# Patient Record
Sex: FEMALE | Race: BLACK OR AFRICAN AMERICAN
[De-identification: names, ages, dates, MRNs, and addresses within clinical notes are randomized per-mention and may not be internally consistent; named-entity substitution may affect disease eponyms.]

---

## 2020-02-24 ENCOUNTER — HOSPITAL ENCOUNTER (OUTPATIENT)
Dept: HOSPITAL 35 - RAD | Age: 80
End: 2020-02-24
Attending: FAMILY MEDICINE
Payer: COMMERCIAL

## 2020-02-24 DIAGNOSIS — M48.02: ICD-10-CM

## 2020-02-24 DIAGNOSIS — M41.86: ICD-10-CM

## 2020-02-24 DIAGNOSIS — M81.0: ICD-10-CM

## 2020-02-24 DIAGNOSIS — M25.78: ICD-10-CM

## 2020-02-24 DIAGNOSIS — M48.04: ICD-10-CM

## 2020-02-24 DIAGNOSIS — M41.80: ICD-10-CM

## 2020-02-24 DIAGNOSIS — M43.02: Primary | ICD-10-CM

## 2020-03-12 ENCOUNTER — HOSPITAL ENCOUNTER (OUTPATIENT)
Dept: HOSPITAL 35 - NUC | Age: 80
End: 2020-03-12
Attending: FAMILY MEDICINE
Payer: COMMERCIAL

## 2020-03-12 DIAGNOSIS — Z13.820: Primary | ICD-10-CM

## 2020-03-12 DIAGNOSIS — M81.0: ICD-10-CM

## 2020-03-12 DIAGNOSIS — Z78.0: ICD-10-CM

## 2022-01-07 ENCOUNTER — HOSPITAL ENCOUNTER (INPATIENT)
Dept: HOSPITAL 35 - ER | Age: 82
LOS: 19 days | Discharge: HOSPICE | DRG: 871 | End: 2022-01-26
Attending: HOSPITALIST | Admitting: HOSPITALIST
Payer: COMMERCIAL

## 2022-01-07 VITALS — HEIGHT: 60 IN | WEIGHT: 104.1 LBS | BODY MASS INDEX: 20.44 KG/M2

## 2022-01-07 VITALS — SYSTOLIC BLOOD PRESSURE: 124 MMHG | DIASTOLIC BLOOD PRESSURE: 57 MMHG

## 2022-01-07 DIAGNOSIS — R62.7: ICD-10-CM

## 2022-01-07 DIAGNOSIS — R41.0: ICD-10-CM
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
## 2022-01-07 DIAGNOSIS — E87.2: ICD-10-CM

## 2022-01-07 DIAGNOSIS — L89.210: ICD-10-CM

## 2022-01-07 DIAGNOSIS — D64.9: ICD-10-CM

## 2022-01-07 DIAGNOSIS — R63.4: ICD-10-CM

## 2022-01-07 DIAGNOSIS — A41.89: Primary | ICD-10-CM
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
## 2022-01-07 DIAGNOSIS — S90.31XA: ICD-10-CM

## 2022-01-07 DIAGNOSIS — F32.9: ICD-10-CM

## 2022-01-07 DIAGNOSIS — D69.6: ICD-10-CM

## 2022-01-07 DIAGNOSIS — Z83.3: ICD-10-CM
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
## 2022-01-07 DIAGNOSIS — L89.113: ICD-10-CM

## 2022-01-07 DIAGNOSIS — E43: ICD-10-CM

## 2022-01-07 DIAGNOSIS — U07.1: ICD-10-CM

## 2022-01-07 DIAGNOSIS — R65.20: ICD-10-CM

## 2022-01-07 DIAGNOSIS — G92.8: ICD-10-CM

## 2022-01-07 DIAGNOSIS — Z66: ICD-10-CM

## 2022-01-07 DIAGNOSIS — S30.0XXA: ICD-10-CM

## 2022-01-07 DIAGNOSIS — Z82.49: ICD-10-CM

## 2022-01-07 DIAGNOSIS — T79.6XXA: ICD-10-CM

## 2022-01-07 DIAGNOSIS — Z99.3: ICD-10-CM

## 2022-01-07 DIAGNOSIS — L89.623: ICD-10-CM
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
## 2022-01-07 DIAGNOSIS — F41.9: ICD-10-CM

## 2022-01-07 DIAGNOSIS — L89.893: ICD-10-CM

## 2022-01-07 DIAGNOSIS — G20: ICD-10-CM

## 2022-01-07 DIAGNOSIS — J12.82: ICD-10-CM

## 2022-01-07 DIAGNOSIS — R53.81: ICD-10-CM

## 2022-01-07 LAB
ALBUMIN SERPL-MCNC: 3.9 G/DL (ref 3.4–5)
ALT SERPL-CCNC: 74 U/L (ref 14–59)
ANION GAP SERPL CALC-SCNC: 12 MMOL/L (ref 7–16)
AST SERPL-CCNC: 201 U/L (ref 15–37)
BILIRUB SERPL-MCNC: 0.9 MG/DL (ref 0.2–1)
BILIRUB UR-MCNC: NEGATIVE MG/DL
BUN SERPL-MCNC: 41 MG/DL (ref 7–18)
CALCIUM SERPL-MCNC: 9.5 MG/DL (ref 8.5–10.1)
CHLORIDE SERPL-SCNC: 100 MMOL/L (ref 98–107)
CO2 SERPL-SCNC: 25 MMOL/L (ref 21–32)
COLOR UR: YELLOW
CREAT SERPL-MCNC: 0.8 MG/DL (ref 0.6–1)
ERYTHROCYTE [DISTWIDTH] IN BLOOD BY AUTOMATED COUNT: 13.3 % (ref 10.5–14.5)
GLUCOSE SERPL-MCNC: 135 MG/DL (ref 74–106)
GRANULOCYTES NFR BLD MANUAL: 91 % (ref 36–66)
HCT VFR BLD CALC: 47.1 % (ref 37–47)
HGB BLD-MCNC: 15.7 GM/DL (ref 12–15)
KETONES UR STRIP-MCNC: (no result) MG/DL
LYMPHOCYTES NFR BLD AUTO: 5 % (ref 24–44)
MCH RBC QN AUTO: 31 PG (ref 26–34)
MCHC RBC AUTO-ENTMCNC: 33.3 G/DL (ref 28–37)
MCV RBC: 93.1 FL (ref 80–100)
MONOCYTES NFR BLD: 3 % (ref 1–8)
NEUTROPHILS # BLD: 13.3 THOU/UL (ref 1.4–8.2)
NEUTS BAND NFR BLD: 1 % (ref 0–8)
PLATELET # BLD EST: NORMAL 10*3/UL
PLATELET # BLD: 196 THOU/UL (ref 150–400)
POTASSIUM SERPL-SCNC: 4.3 MMOL/L (ref 3.5–5.1)
PROT SERPL-MCNC: 7.7 G/DL (ref 6.4–8.2)
RBC # BLD AUTO: 5.06 MIL/UL (ref 4.2–5)
RBC # UR STRIP: (no result) /UL
RBC #/AREA URNS HPF: (no result) /HPF
RBC MORPH BLD: NORMAL
SODIUM SERPL-SCNC: 137 MMOL/L (ref 136–145)
SP GR UR STRIP: >= 1.03 (ref 1–1.03)
SQUAMOUS: (no result) /LPF (ref 0–3)
URINE CLARITY: CLEAR
URINE GLUCOSE-RANDOM*: NEGATIVE
URINE LEUKOCYTES-REFLEX: NEGATIVE
URINE NITRITE-REFLEX: NEGATIVE
URINE PROTEIN (DIPSTICK): (no result)
URINE WBC-REFLEX: (no result) /HPF (ref 0–5)
UROBILINOGEN UR STRIP-ACNC: 0.2 E.U./DL (ref 0.2–1)
WBC # BLD AUTO: 14.5 THOU/UL (ref 4–11)

## 2022-01-07 PROCEDURE — 10081 I&D PILONIDAL CYST COMP: CPT

## 2022-01-07 PROCEDURE — 10080 I&D PILONIDAL CYST SIMPLE: CPT

## 2022-01-07 PROCEDURE — 10879: CPT

## 2022-01-07 PROCEDURE — 10194: CPT

## 2022-01-07 NOTE — EMS
[address]   [address]                     EMS Patient Care Report       
_______________________________________________________________________________
 
Name:       CIERA PRESCOTT             Room #:         351-P       ADM IN  
M.R.#:      [id number]                       Account #:      [id number]  
Admission:  22    Attend Phys:    Franci Blevins MD   
Discharge:              Date of Birth:  [birth year]  
                                                          Report #: [id number]
                                                                    [id number]
_______________________________________________________________________________
THIS REPORT FOR:   //name//                          
 
Report Transmitted: 2022 09:29
EMS Care Summary
[place], [place]/[facility]
Incident [id number] @ 2022 16:11
 
Incident Location
[address] [place] [address]
 
Patient
CIERA PRESCOTT
Female, 81 Years
 1940
 
Patient Address
[address] [place] [address]
 
Patient History
Parkinson's Disease,Anxiety Disorder (Panic Attacks),Anxiety,
 
Patient Allergies
No known allergies,
 
Patient Medications
Gabapentin,
 
Chief Complaint
AMS AFTER FALL
 
Disposition
Transported No Lights/[facility]
 
Dispatch Reason
Falls
 
Transported To
[facility]
 
Narrative
M41 DISPATCHED TO A FALL.
 
M41 AOS WITH P41 AND FOUND A FEMALE PT ON THE FLOOR OF HER BEDROOM.  P41 HAD 
ALREADY PLACED A C COLLAR ON THE PT.  THE PTS FAMILY STATES THT THEY LAST HEARD 
 
 
 
[address]   [address]                     EMS Patient Care Report       
_______________________________________________________________________________
 
Name:       CIERA PRESCOTT             Room #:         351-P       ADM IN  
M.RIVERA.#:      [id number]                       Account #:      [id number]  
Admission:  22    Attend Phys:    Franci Blevins MD   
Discharge:              Date of Birth:  [birth year]  
                                                          Report #: [id number]
                                                                    [id number]
_______________________________________________________________________________
FROM HER 2 DAYS AGO AND NO ONE HAS SEEN HER SINCE.  THE PT APPEARS TO HAVE 
FALLEN AT LEAST 24 HOURS AGO.  AMMONIA SMELL WAS NOTED FROM THE FRONT DOOR.  
THE PT DOES HAVE A SMALL LACERATION ABOVE HER LEFT EYE.  PT IS NOT ON ANY BLOOD 
THINNERS.  THE PT DENIES ANY PAIN.  PT DOES ANSWER QUESTIONS BUT IS SLIGHTLY 
CONFUSED.  THE PTS HX, MEDS, AND ALLERGIES GATHERED FROM FAMILY. 
 
PT MOVED TO A STAIRCHAIR AND TO THE COT. VITALS OBTAINED. BGA OBTAINED. IV 
ACCESS OBTAINED. 4 LEAD OBTAINED. PT PLACED ON 4LPM O2 NC. 
 
M41 EN ROUTE ST [facility]. EN ROUTE PT REMAINED STABLE.
 
REPORT GIVEN TO ALEKSANDER CARD. SIGNATURES OBTAINED. I SIGNED FOR PT DUE TO AMS. 
TRANSFER OF CARE TOOK PLACE. 
 
M41 IN SERVICE.
 
 
 
XIOMARA PAYNE
PARAMEDIC
 
Initial Vitals
@16:30P: 57,SpO2: 80,
@16:41P: 114,BP: 128/73,
@16:35P: 115,R: 18,BP: 115/74,Pain: 0/10,GCS: 14,Glucose: 122,CO: 0,SpO2: 
94,Revised Trauma: 12,MI Suspected: false 
@16:30P: 75,R: 18,BP: 130/70,Pain: 0/10,GCS: 14,SpO2: 80,Revised Trauma: 12,
 
Assessments
@16:18MENTAL:Confused,Place Oriented,Time Oriented,SKIN:HEENT:Head/Face: 
Other,Neck/Airway: No Abnormalities,LUNG SOUNDS:General: No 
Abnormalities,ABDOMEN:General: No Abnormalities,PELVIS//GI:No 
Abnormalities,EXTREMITIES:Left Arm: Other,Left Leg: Other,Right Leg: 
Other,Right Arm: Other,Capillary Refill: Right Upper: < 2 Sec,PULSE:Radial: 2+ 
Normal,NEURO:No Abnormalities, 
 
Impression
Altered Mental Status
 
Procedures
@16:33 IV Therapy - Saline Lock 7cc (20 ga) Site: Forearm-Left Response: 
UnchangedSucceeded 
@16:18 ALS Assessment Response: UnchangedSucceeded
@16:34 3-Lead ECG Response: UnchangedSucceeded
@16:33 Oxygen FlowRate: 4 Device: Nasal Cannula (NC)  Response: 
ImprovedSucceeded 
 
 
 
[facility]
[address]
[place]   [address]                     EMS Patient Care Report       
_______________________________________________________________________________
 
Name:       [facility]             Room #:         351-P       ADM IN  
M.R.#:      [id number]                       Account #:      [id number]  
Admission:  22    Attend Phys:    Franci Blevins MD   
Discharge:              Date of Birth:  [birth year]  
                                                          Report #: [id number]
                                                                    [id number]
_______________________________________________________________________________
@PTASpinal Motion Restriction Response: UnchangedSucceeded
 
 
Timeline
PTA,Spinal Motion Restriction,Response: UnchangedSucceeded,
16:08,Call Received
16:08,Dispatch Notified
16:11,Dispatched
16:12,En Route
16:16,On Scene
16:18,At Patient
16:18,ALS Assessment,Response: UnchangedSucceeded,
16:30,BP: / M,PULSE: 57,RR:  R,SPO2: 80 Ox,ETCO2:  ,BG: ,PAIN: ,GCS: ,
16:30,BP: 130/70 M,PULSE: 75,RR: 18 R,SPO2: 80 Ox,ETCO2:  ,BG: ,PAIN: 0,GCS: 14,
16:33,IV Therapy - Saline Lock 7cc 20 ga Site: Forearm-Left,Response: 
UnchangedSucceeded, 
16:33,Oxygen FlowRate: 4 Device: Nasal Cannula (NC) Response: ImprovedSucceeded,
16:34,3-Lead ECG,Response: UnchangedSucceeded,
16:35,BP: 115/74 M,PULSE: 115,RR: 18 R,SPO2: 94 Ox,ETCO2:  ,B,PAIN: 
0,GCS: 14, 
16:37,Depart Scene
16:41,BP: 128/73 M,PULSE: 114,RR:  R,SPO2:  Ox,ETCO2:  ,BG: ,PAIN: ,GCS: ,
16:49,At Destination
17:13,Call Closed
 
Disclaimer
v1.1     Copyright  [employer]
This EMS Care Summary contains data elements from the applicable legal record 
(which may be displayed differently). It is designed to provide pertinent 
information for the following purposes: continuity of care, clinical quality, 
and state data reporting. The complete legal record is available to ED staff 
and administrators of the receiving hospital in [employer] Patient Tracker. All data 
is provided "as is."

## 2022-01-08 VITALS — SYSTOLIC BLOOD PRESSURE: 153 MMHG | DIASTOLIC BLOOD PRESSURE: 74 MMHG

## 2022-01-08 VITALS — SYSTOLIC BLOOD PRESSURE: 119 MMHG | DIASTOLIC BLOOD PRESSURE: 66 MMHG

## 2022-01-08 LAB
ANION GAP SERPL CALC-SCNC: 13 MMOL/L (ref 7–16)
BUN SERPL-MCNC: 42 MG/DL (ref 7–18)
CALCIUM SERPL-MCNC: 8.9 MG/DL (ref 8.5–10.1)
CHLORIDE SERPL-SCNC: 105 MMOL/L (ref 98–107)
CO2 SERPL-SCNC: 24 MMOL/L (ref 21–32)
CREAT SERPL-MCNC: 0.7 MG/DL (ref 0.6–1)
ERYTHROCYTE [DISTWIDTH] IN BLOOD BY AUTOMATED COUNT: 13.4 % (ref 10.5–14.5)
GLUCOSE SERPL-MCNC: 104 MG/DL (ref 74–106)
HCT VFR BLD CALC: 41.6 % (ref 37–47)
HGB BLD-MCNC: 13.8 GM/DL (ref 12–15)
MCH RBC QN AUTO: 31.1 PG (ref 26–34)
MCHC RBC AUTO-ENTMCNC: 33.1 G/DL (ref 28–37)
MCV RBC: 93.9 FL (ref 80–100)
PLATELET # BLD: 169 THOU/UL (ref 150–400)
POTASSIUM SERPL-SCNC: 4 MMOL/L (ref 3.5–5.1)
RBC # BLD AUTO: 4.43 MIL/UL (ref 4.2–5)
SODIUM SERPL-SCNC: 142 MMOL/L (ref 136–145)
WBC # BLD AUTO: 13.1 THOU/UL (ref 4–11)

## 2022-01-08 PROCEDURE — XW033E5 INTRODUCTION OF REMDESIVIR ANTI-INFECTIVE INTO PERIPHERAL VEIN, PERCUTANEOUS APPROACH, NEW TECHNOLOGY GROUP 5: ICD-10-PCS | Performed by: HOSPITALIST

## 2022-01-08 NOTE — NUR
PT AWAKE IN BED. ALERT TO NAME. PT NOT ANSWERING MOST QUESTIONS. PT ASKED IF
SHE WAS HUNGRY SAID YES, FED A FEW BITES OF DINNER TRAY ON TABLE SIDE. PT ABLE
TO SAY NO TO WHAT SHE DID NOT LIKE. PT OFFERED ICE CREAM AND ATE IT ALL. LUNGS
DIMINISHED. INCONTINENT, FEMALE EXTERNAL CATH INTACT. PT HAD L EYE MATTED AND
REMOVED WITH WMP. PT ABLE TO OPEN L EYE
UPON REQUEST STATED IT HURTS, NO REDNESS
OBSERVED ON SCLERA. PT HAS
POOR MOVEMENT WITH BUE AND REFUSED TO MOVE BLE. NOTED
TREMORS IN BUE. PT DID TALK ON PHONE WITH  AND COMPLETED SENTENCES WITH
HIM ON THE PHONE. BED ALARM ON. IVF INTACT.

## 2022-01-08 NOTE — NUR
TALKED WITH DAUGHTER REGARDING C0VID STATUS.  SHE IS IN MEDICAL FIELD
AWARE OF RESTRICTIONS, SIGNS AND SYPTOMS, CARE

## 2022-01-08 NOTE — NUR
PATIENT ADMIT TO UNIT AT 1750. ALERT. CONFUSED. NOT ABLE TO FEED SELF.
STIFFNESS OF BLE. TOLERATED ON RA. WILL KEEP MONITOR,

## 2022-01-09 VITALS — SYSTOLIC BLOOD PRESSURE: 167 MMHG | DIASTOLIC BLOOD PRESSURE: 75 MMHG

## 2022-01-09 VITALS — SYSTOLIC BLOOD PRESSURE: 151 MMHG | DIASTOLIC BLOOD PRESSURE: 67 MMHG

## 2022-01-09 VITALS — DIASTOLIC BLOOD PRESSURE: 71 MMHG | SYSTOLIC BLOOD PRESSURE: 161 MMHG

## 2022-01-09 VITALS — SYSTOLIC BLOOD PRESSURE: 150 MMHG | DIASTOLIC BLOOD PRESSURE: 62 MMHG

## 2022-01-09 LAB
ALBUMIN SERPL-MCNC: 2.6 G/DL (ref 3.4–5)
ALT SERPL-CCNC: 24 U/L (ref 30–65)
ANION GAP SERPL CALC-SCNC: 10 MMOL/L (ref 7–16)
AST SERPL-CCNC: 91 U/L (ref 15–37)
BASOPHILS NFR BLD AUTO: 0.6 % (ref 0–2)
BILIRUB SERPL-MCNC: 0.6 MG/DL (ref 0.2–1)
BUN SERPL-MCNC: 27 MG/DL (ref 7–18)
CALCIUM SERPL-MCNC: 8.3 MG/DL (ref 8.5–10.1)
CHLORIDE SERPL-SCNC: 111 MMOL/L (ref 98–107)
CO2 SERPL-SCNC: 25 MMOL/L (ref 21–32)
CREAT SERPL-MCNC: 0.5 MG/DL (ref 0.6–1)
EOSINOPHIL NFR BLD: 0.5 % (ref 0–3)
ERYTHROCYTE [DISTWIDTH] IN BLOOD BY AUTOMATED COUNT: 13.6 % (ref 10.5–14.5)
GLUCOSE SERPL-MCNC: 90 MG/DL (ref 74–106)
GRANULOCYTES NFR BLD MANUAL: 75.7 % (ref 36–66)
HCT VFR BLD CALC: 38.8 % (ref 37–47)
HGB BLD-MCNC: 12.7 GM/DL (ref 12–15)
LYMPHOCYTES NFR BLD AUTO: 7.6 % (ref 24–44)
MCH RBC QN AUTO: 30.6 PG (ref 26–34)
MCHC RBC AUTO-ENTMCNC: 32.8 G/DL (ref 28–37)
MCV RBC: 93.1 FL (ref 80–100)
MONOCYTES NFR BLD: 15.6 % (ref 1–8)
NEUTROPHILS # BLD: 7.2 THOU/UL (ref 1.4–8.2)
PLATELET # BLD: 138 THOU/UL (ref 150–400)
POTASSIUM SERPL-SCNC: 3.4 MMOL/L (ref 3.5–5.1)
PROT SERPL-MCNC: 5.8 G/DL (ref 6.4–8.2)
RBC # BLD AUTO: 4.17 MIL/UL (ref 4.2–5)
SODIUM SERPL-SCNC: 146 MMOL/L (ref 136–145)
WBC # BLD AUTO: 9.5 THOU/UL (ref 4–11)

## 2022-01-09 NOTE — NUR
ASSUMED PATIENT CARE AT 0700. AERLT. ASSISTED TURN AND FEEDING. VSS. TOLERATED
ON RA. SLOWLY TOWARDS TO POC GOALS.

## 2022-01-09 NOTE — HC
[facility]
[address]
[place]   [address]                     CONSULTATION                  
_______________________________________________________________________________
 
Name:       CIERA PRESCOTT             Room #:         351-P       ADM IN  
M.R.#:      [id number]                       Account #:      [id number]  
Admission:  01/07/22    Attend Phys:    Timothy Calvert MD      
Discharge:              Date of Birth:  [birth year]  
                                                          Report #: [id number]
                                                                    [id number] 
_______________________________________________________________________________
THIS REPORT FOR:  
 
cc:  Hilton Tripp,Vadim Hudson MD                                            ~
 
DATE OF SERVICE: 01/08/2022
 
INFECTIOUS DISEASE CONSULTATION
 
REASON FOR CONSULTATION:  I was asked to evaluate concerning COVID-19 infection.
 
HISTORY OF PRESENT ILLNESS:  The patient is a 81-year-old with underlying 
history of advanced Parkinson's disease, who was home alone following her 
 going into the hospital.  She was being checked on by her daughter more 
than a day before this.  The patient apparently fell to the floor and was unable
to get up.  She was on the floor for several days by her account, brought into 
the Emergency Room, found to be COVID positive.  No cough or sputum production. 
Oxygen requirements have been nil.  She has had no oxygen requirements.  Chest 
x-ray was clear.  Denies any fever, chills or sweats.  Exactly how and why she 
fell was not known.  She has had bruising and skin pressure wounds.  She also 
has rhabdomyolysis.  Her CPK on admission was 5000.  Urine output has been 
reasonable.
 
REVIEW OF SYSTEMS:  A 14-point review of system was negative other than what has
been described above.
 
PAST MEDICAL HISTORY:  Parkinson's disease, anxiety, depression and glaucoma.
 
FAMILY HISTORY:  Coronary artery disease and diabetes.
 
SOCIAL HISTORY:  Nonsmoker, no significant alcohol intake.
 
ALLERGIES:  None known.
 
MEDICATIONS:  As noted on her MAR, which were reviewed.
 
PHYSICAL EXAMINATION:
GENERAL:  She was afebrile and hemodynamically stable.  She had advanced 
Parkinson's disease with masked faces, tremor, increased muscle tone throughout,
had to be rolled over in bed.
SKIN:  Had multiple pressure wounds, mostly on her left side, thigh and knee 
with contusion to her right shoulder.  Left periorbital ecchymosis.
HEENT:  No palpable adenopathy.  Mouth without mucositis.
NECK:  Supple. Overall increased muscle tone however.
LUNGS:  Clear.
 
 
 
[facility]
[address]
[place]   [address]                     CONSULTATION                  
_______________________________________________________________________________
 
Name:       CIERA PRESCOTT             Room #:         351-[facility] [place]  
M.R.#:      [id number]                       Account #:      [id number]  
Admission:  01/07/22    Attend Phys:    Timothy Calvert MD      
Discharge:              Date of Birth:  [birth year]  
                                                          Report #: [id number]
                                                                    [id number] 
_______________________________________________________________________________
 
HEART:  Regular, without murmur.
ABDOMEN:  Soft and nontender.
PSYCHIATRIC:  Mood without anxiety.
BACK:  Nontender.
GENITORECTAL:  Examination not performed.
 
LABORATORY DATA:  Reviewed.
 
MICROBIOLOGY:  Reviewed.
 
RADIOGRAPHS:  Reviewed.
 
IMPRESSION:  A 81-year-old with advanced Parkinson's disease, presents now for 
COVID-19 infection without evidence of pneumonia.
1.  Rhabdomyolysis, so far no evidence of renal failure.
2.  Mild increased transaminase levels.
3.  Controlled anxiety and depression.
 
RECOMMENDATION:  We will continue with fluid management and follow her CPK 
levels as well as creatinine.  Give remdesivir for 3-5 days for acute COVID-19 
infection.
 
 
 
 
 
 
 
 
 
 
 
 
 
 
 
 
 
 
 
 
 
 
  <ELECTRONICALLY SIGNED>
   By: Vadim Sims MD            
  01/09/22 2026
D: 01/08/22 2026                           _____________________________________
T: 01/08/22 2202                           Vadim Sims MD              /nt

## 2022-01-10 VITALS — SYSTOLIC BLOOD PRESSURE: 160 MMHG | DIASTOLIC BLOOD PRESSURE: 80 MMHG

## 2022-01-10 VITALS — SYSTOLIC BLOOD PRESSURE: 144 MMHG | DIASTOLIC BLOOD PRESSURE: 69 MMHG

## 2022-01-10 VITALS — SYSTOLIC BLOOD PRESSURE: 160 MMHG | DIASTOLIC BLOOD PRESSURE: 67 MMHG

## 2022-01-10 VITALS — DIASTOLIC BLOOD PRESSURE: 82 MMHG | SYSTOLIC BLOOD PRESSURE: 144 MMHG

## 2022-01-10 VITALS — DIASTOLIC BLOOD PRESSURE: 72 MMHG | SYSTOLIC BLOOD PRESSURE: 151 MMHG

## 2022-01-10 LAB
ALBUMIN SERPL-MCNC: 2.2 G/DL (ref 3.4–5)
ALT SERPL-CCNC: 37 U/L (ref 30–65)
ANION GAP SERPL CALC-SCNC: 6 MMOL/L (ref 7–16)
AST SERPL-CCNC: 60 U/L (ref 15–37)
BASOPHILS NFR BLD AUTO: 0.4 % (ref 0–2)
BILIRUB DIRECT SERPL-MCNC: 0.1 MG/DL
BILIRUB SERPL-MCNC: 0.5 MG/DL (ref 0.2–1)
BUN SERPL-MCNC: 27 MG/DL (ref 7–18)
CALCIUM SERPL-MCNC: 7.9 MG/DL (ref 8.5–10.1)
CHLORIDE SERPL-SCNC: 110 MMOL/L (ref 98–107)
CO2 SERPL-SCNC: 28 MMOL/L (ref 21–32)
CREAT SERPL-MCNC: 0.5 MG/DL (ref 0.6–1)
EOSINOPHIL NFR BLD: 0.9 % (ref 0–3)
ERYTHROCYTE [DISTWIDTH] IN BLOOD BY AUTOMATED COUNT: 13.3 % (ref 10.5–14.5)
GLUCOSE SERPL-MCNC: 109 MG/DL (ref 74–106)
GRANULOCYTES NFR BLD MANUAL: 72.1 % (ref 36–66)
HCT VFR BLD CALC: 35.2 % (ref 37–47)
HGB BLD-MCNC: 11.5 GM/DL (ref 12–15)
LYMPHOCYTES NFR BLD AUTO: 10.6 % (ref 24–44)
MCH RBC QN AUTO: 30.7 PG (ref 26–34)
MCHC RBC AUTO-ENTMCNC: 32.6 G/DL (ref 28–37)
MCV RBC: 94.1 FL (ref 80–100)
MONOCYTES NFR BLD: 16 % (ref 1–8)
NEUTROPHILS # BLD: 5.7 THOU/UL (ref 1.4–8.2)
PHOSPHATE SERPL-MCNC: 2.1 MG/DL (ref 2.5–4.9)
PLATELET # BLD: 113 THOU/UL (ref 150–400)
POTASSIUM SERPL-SCNC: 3.7 MMOL/L (ref 3.5–5.1)
PROT SERPL-MCNC: 5.1 G/DL (ref 6.4–8.2)
RBC # BLD AUTO: 3.74 MIL/UL (ref 4.2–5)
SODIUM SERPL-SCNC: 144 MMOL/L (ref 136–145)
WBC # BLD AUTO: 7.9 THOU/UL (ref 4–11)

## 2022-01-10 NOTE — EKG
[address]
[place]  [address]
Phone:  [phone]                    ELECTROCARDIOGRAM REPORT      
_______________________________________________________________________________
 
Name:       CIERA PRESCOTT             Room #:         351-P       ADM IN  
M.R.#:      [id number]     Account #:      [id number]  
Admission:  22    Attend Phys:    Timothy Calvert MD      
Discharge:              Date of Birth:  [birth year]  
                                                          Report #: [id number]
   [id number]
_______________________________________________________________________________
                         [facility] ED
                                       
Test Date:    2022               Test Time:    17:09:29
Pat Name:     CIERA PRESCOTT        Department:   
Patient ID:   [id number]            Room:         [facility]
Gender:       F                        Technician:   COURT
:          1940               Requested By: Markus Maria
Order Number: [id number] MD:   Rigoberto Charles
                                 Measurements
Intervals                              Axis          
Rate:         109                      P:            88
DC:           181                      QRS:          78
QRSD:         111                      T:            62
QT:           341                                    
QTc:          460                                    
                           Interpretive Statements
Sinus tachycardia
ROBERTA, consider biatrial enlargement
Low voltage, extremity leads
Artifact in lead(s) I,II,III,aVR,aVL,aVF,V1,V2,V3,V4,V5,V6 and baseline
wander
in lead(s) V6
Compared to ECG 09/10/1994 21:00:00
Low QRS voltage now present
Sinus rhythm no longer present
First degree AV block no longer present
Electronically Signed On 1- 7:35:25 CST by Rigoberto Charles
[email or web address]
 
 
 
 
 
 
 
 
 
 
 
 
 
 
 
  <ELECTRONICALLY SIGNED>
   By: Rigoberto Charles MD, [facility]    
  01/10/22     0735
D: 22                           _____________________________________
T: 22 170                           Rigoberto Charles MD, [facility]      /EPI

## 2022-01-10 NOTE — NUR
INITIAL ASSESSMENT:
Received consult. SW reviewed chart and spoke with nursing and attending
physician. Pt was admitted from home due to AMS/sepsis. Pt placed in Enhanced
Isolation due to COVID. Pt is afebrile and is on room air. Pt is on
Remdesivir. Pt with hx of Parkinson's Disease. Therapy evals ordered. Pt's
spouse has been hospitalized at [facility] due to COVID. Pt's spouse states that his
plan is for pt to return home. SW received call from pt's dtr, Camryn. Lengthy
discussion regarding pt's living situation. Pt lives at home with her ,
Jeremy, in a split level home. Pt is mainly w/c bound due. Pt does have a
walker to use as needed. Pt is not able to navigate the stairs independently.
Pt's  was recently hospitalized at [facility] and pt was left at home alone
while her  was in the hospital. Pt was home alone for three days. Pt's
dtr went to check on pt and found her on the floor. Pt's dtr has concerns
about pt's 's ability to care for pt. Pt does not have a DPOA. ST eval
ordered to assess pt's cognition. SW briefly discussed discharge disposition:
SNF v. home with HH. Pt has not had HH in the past or post-acute placement.
PT's PCP is Dr. Tripp. Pt's dtr is hopeful that pt will be able to go to
post-acute placement for continued rehab. SW discussed that should pt return
home, HH can be ordered and a HH SW can assess the situation. [facility] which [facility]. SW explained role of \[facility] [place] [facility]\"".  Pt is
retired. SW discussed insurance. Pt's dtr states that she thinks pt's [employer] is primary. SW to discuss with UR. SW to contact pt's spouse to discuss
discharge planning. SW is following to assist as needed with discharge
planning.

## 2022-01-10 NOTE — NUR
WOUND CONSULT;
THE PATIENT HAS AN UNSTABLE DTI TO THE RIGHT HIP.  NO S/S OF INFECTION.
ALBUMIN IS N2.2 AND THE CURRENT SHALA IS 17.
 
RECOMMENDATIONS;
-LOW AIRLOSS PUMP
-Q2H TURNING
-RIGHT HIP: BOARDER FOAMS X 2
 
DISCUSSED WITH RN

## 2022-01-11 VITALS — SYSTOLIC BLOOD PRESSURE: 130 MMHG | DIASTOLIC BLOOD PRESSURE: 58 MMHG

## 2022-01-11 VITALS — DIASTOLIC BLOOD PRESSURE: 82 MMHG | SYSTOLIC BLOOD PRESSURE: 142 MMHG

## 2022-01-11 VITALS — DIASTOLIC BLOOD PRESSURE: 65 MMHG | SYSTOLIC BLOOD PRESSURE: 135 MMHG

## 2022-01-11 VITALS — DIASTOLIC BLOOD PRESSURE: 78 MMHG | SYSTOLIC BLOOD PRESSURE: 142 MMHG

## 2022-01-11 LAB
ALBUMIN SERPL-MCNC: 2.2 G/DL (ref 3.4–5)
ALT SERPL-CCNC: 18 U/L (ref 30–65)
ANION GAP SERPL CALC-SCNC: 7 MMOL/L (ref 7–16)
AST SERPL-CCNC: 42 U/L (ref 15–37)
BASOPHILS NFR BLD AUTO: 0.5 % (ref 0–2)
BILIRUB DIRECT SERPL-MCNC: 0.1 MG/DL
BILIRUB SERPL-MCNC: 0.5 MG/DL (ref 0.2–1)
BUN SERPL-MCNC: 19 MG/DL (ref 7–18)
CALCIUM SERPL-MCNC: 8 MG/DL (ref 8.5–10.1)
CHLORIDE SERPL-SCNC: 103 MMOL/L (ref 98–107)
CO2 SERPL-SCNC: 28 MMOL/L (ref 21–32)
CREAT SERPL-MCNC: 0.5 MG/DL (ref 0.6–1)
EOSINOPHIL NFR BLD: 3.2 % (ref 0–3)
ERYTHROCYTE [DISTWIDTH] IN BLOOD BY AUTOMATED COUNT: 13 % (ref 10.5–14.5)
GLUCOSE SERPL-MCNC: 110 MG/DL (ref 74–106)
GRANULOCYTES NFR BLD MANUAL: 66.6 % (ref 36–66)
HCT VFR BLD CALC: 34.9 % (ref 37–47)
HGB BLD-MCNC: 11.6 GM/DL (ref 12–15)
LYMPHOCYTES NFR BLD AUTO: 13.9 % (ref 24–44)
MCH RBC QN AUTO: 30.8 PG (ref 26–34)
MCHC RBC AUTO-ENTMCNC: 33.4 G/DL (ref 28–37)
MCV RBC: 92.4 FL (ref 80–100)
MONOCYTES NFR BLD: 15.8 % (ref 1–8)
NEUTROPHILS # BLD: 4.8 THOU/UL (ref 1.4–8.2)
PHOSPHATE SERPL-MCNC: 3 MG/DL (ref 2.5–4.9)
PLATELET # BLD: 135 THOU/UL (ref 150–400)
POTASSIUM SERPL-SCNC: 3.6 MMOL/L (ref 3.5–5.1)
PROT SERPL-MCNC: 5.3 G/DL (ref 6.4–8.2)
RBC # BLD AUTO: 3.77 MIL/UL (ref 4.2–5)
SODIUM SERPL-SCNC: 138 MMOL/L (ref 136–145)
WBC # BLD AUTO: 7.2 THOU/UL (ref 4–11)

## 2022-01-11 NOTE — NUR
Magrie has progressed towards outcome goals. Oxygenation optimal on room air.
Await discharge disposition. High fall risks, fall precautions in place.
IVfluids infusing. Turned to sides, patient is total care.

## 2022-01-11 NOTE — NUR
SIVAN reviewed chart and spoke with nursing and attending physician. Pt remains
in Enhanced Isolation due to COVID. Pt is afebrile and on room air. Pt is on
Remdesvir. Recommendation made for pt to discharge to post-acute care for
continued rehab services. SIVAN discussed case with [facility] re[facility] liaison. Pt's
insurance is not in-network with . SIVAN confirmed with UR that pt's Federal
Blue Cross/Blue Shield policy is primary. Medicare Part A is secondary. SIVAN
spoke with pt's , Nakia, via phone. Introduced role of SW. Nakia
states that he was hospitalized at [facility] due to cardiac related issues. Pt was
in [facility] ER on 12/30-12/31 awaiting a bed assignment. Pt discharged home on
12/31 and then followed up with his cardiologist at  on 1/4, and was then
directly admitted to the hospital. Pt's spouse states that pt has been doing
okay at home. Pt has good and bad days due to her Parkinson's Disease. Pt is
primarily w/c bound, but is able to walk and navigate stairs, per pt's
. Pt's PCP is Dr. Solano. No hx of HH or post-acute placement. Pt's
spouse states pt was doing outpatient physical therapy at [facility]. SIVAN
discussed recommendation for post-acute placement prior to returning home.
Pt's spouse states he is willing to consider placement if needed and insurance
will cover. Pt's spouse confirmed that pt's Blue Cross policy is primary. SW
to obtain lists of in-network providers (Acute rehab/SNF/HH) for pt's spouse
to review. SIVAN also spoke with pt's dtr, Camryn, via phone to provide update.
Camryn states she would also like to review the in-network providers. SIVAN is
following to assist as needed with discharge planning.

## 2022-01-12 VITALS — SYSTOLIC BLOOD PRESSURE: 136 MMHG | DIASTOLIC BLOOD PRESSURE: 67 MMHG

## 2022-01-12 VITALS — DIASTOLIC BLOOD PRESSURE: 71 MMHG | SYSTOLIC BLOOD PRESSURE: 133 MMHG

## 2022-01-12 VITALS — DIASTOLIC BLOOD PRESSURE: 70 MMHG | SYSTOLIC BLOOD PRESSURE: 142 MMHG

## 2022-01-12 VITALS — DIASTOLIC BLOOD PRESSURE: 60 MMHG | SYSTOLIC BLOOD PRESSURE: 133 MMHG

## 2022-01-12 LAB
ALBUMIN SERPL-MCNC: 2.3 G/DL (ref 3.4–5)
ALT SERPL-CCNC: 30 U/L (ref 30–65)
ANION GAP SERPL CALC-SCNC: 10 MMOL/L (ref 7–16)
AST SERPL-CCNC: 36 U/L (ref 15–37)
BASOPHILS NFR BLD AUTO: 1.1 % (ref 0–2)
BILIRUB DIRECT SERPL-MCNC: 0.1 MG/DL
BILIRUB SERPL-MCNC: 0.4 MG/DL (ref 0.2–1)
BUN SERPL-MCNC: 17 MG/DL (ref 7–18)
CALCIUM SERPL-MCNC: 8.2 MG/DL (ref 8.5–10.1)
CHLORIDE SERPL-SCNC: 102 MMOL/L (ref 98–107)
CO2 SERPL-SCNC: 27 MMOL/L (ref 21–32)
CREAT SERPL-MCNC: 0.4 MG/DL (ref 0.6–1)
EOSINOPHIL NFR BLD: 2.9 % (ref 0–3)
ERYTHROCYTE [DISTWIDTH] IN BLOOD BY AUTOMATED COUNT: 12.8 % (ref 10.5–14.5)
GLUCOSE SERPL-MCNC: 114 MG/DL (ref 74–106)
GRANULOCYTES NFR BLD MANUAL: 64.4 % (ref 36–66)
HCT VFR BLD CALC: 35.2 % (ref 37–47)
HGB BLD-MCNC: 12.3 GM/DL (ref 12–15)
LYMPHOCYTES NFR BLD AUTO: 12.8 % (ref 24–44)
MCH RBC QN AUTO: 32 PG (ref 26–34)
MCHC RBC AUTO-ENTMCNC: 34.9 G/DL (ref 28–37)
MCV RBC: 91.7 FL (ref 80–100)
MONOCYTES NFR BLD: 18.8 % (ref 1–8)
NEUTROPHILS # BLD: 4.3 THOU/UL (ref 1.4–8.2)
PHOSPHATE SERPL-MCNC: 3.5 MG/DL (ref 2.5–4.9)
PLATELET # BLD: 150 THOU/UL (ref 150–400)
POTASSIUM SERPL-SCNC: 3.8 MMOL/L (ref 3.5–5.1)
PROT SERPL-MCNC: 4.7 G/DL (ref 6.4–8.2)
RBC # BLD AUTO: 3.84 MIL/UL (ref 4.2–5)
SODIUM SERPL-SCNC: 139 MMOL/L (ref 136–145)
WBC # BLD AUTO: 6.6 THOU/UL (ref 4–11)

## 2022-01-12 NOTE — NUR
ASSUMED PATIENT CARE AT 0700. A/O 2. NO DISTRESS NOTED. ASSISTED WITH MEALS.
SLOWLY TOWARDS POC GOALS.

## 2022-01-12 NOTE — NUR
SIVAN reviewed chart and spoke with nursing and attending physician. Pt remains
in Enhanced Isolation due to COVID. Pt is afebrile and on room air. Pt is
completing course of Remdesivir. SW notified this morning that pt's primary
insurance is Medicare. BCBS is secondary. SIVAN discussed case with [facility] rehab
liaison to evaluate for possible admission to in acute rehab. [facility] consult
ordered. Awaiting input from [facility] at this time. SW to discuss alternate
rehab/SNF options with pt's family if needed.  SIVAN is following to assist as
needed with discharge planning.

## 2022-01-12 NOTE — NUR
patient is alert and oriented x3 this shift. She appears to be confused.
Patient asks multiple times this shift if she can go home. Patient is easily
redirected. Patient is CCT and has been sinus rhythm with a 1st degree av
block this shift. Patient is on enhanced precautions. Patient is on room air.
Patient has a purewick in place. Patient has been incontinent of both urine
this shift. Patient has meiplex on her right hip present this shift. Patient
has an IV in her left forearm with D5 running at 75mL's per hour. Patient is
stiff with contractures and cries out any time she is moved. Patient has been
turned Q2 hours as ordered this shift. Patient will continue to be monitored.

## 2022-01-13 VITALS — DIASTOLIC BLOOD PRESSURE: 65 MMHG | SYSTOLIC BLOOD PRESSURE: 121 MMHG

## 2022-01-13 VITALS — SYSTOLIC BLOOD PRESSURE: 116 MMHG | DIASTOLIC BLOOD PRESSURE: 66 MMHG

## 2022-01-13 VITALS — DIASTOLIC BLOOD PRESSURE: 68 MMHG | SYSTOLIC BLOOD PRESSURE: 115 MMHG

## 2022-01-13 VITALS — SYSTOLIC BLOOD PRESSURE: 131 MMHG | DIASTOLIC BLOOD PRESSURE: 57 MMHG

## 2022-01-13 LAB
ALBUMIN SERPL-MCNC: 2.2 G/DL (ref 3.4–5)
ALT SERPL-CCNC: 20 U/L (ref 30–65)
ANION GAP SERPL CALC-SCNC: 5 MMOL/L (ref 7–16)
AST SERPL-CCNC: 33 U/L (ref 15–37)
BILIRUB DIRECT SERPL-MCNC: 0.1 MG/DL
BILIRUB SERPL-MCNC: 0.4 MG/DL (ref 0.2–1)
BUN SERPL-MCNC: 18 MG/DL (ref 7–18)
CALCIUM SERPL-MCNC: 8.1 MG/DL (ref 8.5–10.1)
CHLORIDE SERPL-SCNC: 102 MMOL/L (ref 98–107)
CO2 SERPL-SCNC: 30 MMOL/L (ref 21–32)
CREAT SERPL-MCNC: 0.5 MG/DL (ref 0.6–1)
GLUCOSE SERPL-MCNC: 109 MG/DL (ref 74–106)
PHOSPHATE SERPL-MCNC: 3.3 MG/DL (ref 2.5–4.9)
POTASSIUM SERPL-SCNC: 4 MMOL/L (ref 3.5–5.1)
PROT SERPL-MCNC: 4.7 G/DL (ref 6.4–8.2)
SODIUM SERPL-SCNC: 137 MMOL/L (ref 136–145)

## 2022-01-13 NOTE — NUR
Patient is alert and oriented x4 this shift. Patient is med surge status.
Patient is on room air. Patients has had no BM's this shift. Following
patients bath two pressure injuries were found on patients right shoulder.
Patient injuries were cleaned with normal saline and a foam gauze was applied.
Patient has been turned Q2 hours as ordered. Patient continues on enhanced
precautions. Patient will continue to be monitored.

## 2022-01-13 NOTE — NUR
WOUND CARE F/U:
THERE IS A NEW WOUND TO THE RIGHT SHOULDER BLADE, THE ETIOLOGY IS LIKELY A
SKIN TEAR AS LINEAR LINES ARE PRESENT.  THE RIGHT HIP HAS SOME NONVIABLE
TISSUE PRESENT.
 
RECOMMENDATIONS;
-CONTINUE CURRENT TREATMENT/DRESSINGS.
-ADD A BORDERED FOAM TO THE RIGHT SHOULDER BLADE.
-CONSULT DR TRAVIS HARRIS.
 
DISCUSSED WITH ALEKSANDER

## 2022-01-13 NOTE — NUR
SIVAN reviewed chart and spoke with nursing. Pt remains in Enhanced Isolation due
to COVID. Pt is afebrile and on room air. SIVAN faxed SNF referral to
[facility] for review. SIVAN spoke with Reyes in admissions at
[facility] to notify of new referral. Reyes to review referral. SIVAN is
following to assist as needed with discharge planning.

## 2022-01-13 NOTE — NUR
Pt. has been awake most of the night. Pleasantly confused. She has been
repositioned for comfort. Tolerating room air well. Cont. on enhanced
precaution , afebrile. External cath in place. Bed alarm for safety. Making
some progress towards care plan goals.

## 2022-01-14 VITALS — DIASTOLIC BLOOD PRESSURE: 64 MMHG | SYSTOLIC BLOOD PRESSURE: 145 MMHG

## 2022-01-14 VITALS — DIASTOLIC BLOOD PRESSURE: 75 MMHG | SYSTOLIC BLOOD PRESSURE: 126 MMHG

## 2022-01-14 VITALS — SYSTOLIC BLOOD PRESSURE: 135 MMHG | DIASTOLIC BLOOD PRESSURE: 68 MMHG

## 2022-01-14 NOTE — NUR
PATIENT IS ALERT AND ORIENTED X2/3 THIS SHIFT. PATIENT IS MEDSURGE STATUS.
PATIENT TAKES HER PILLS WHOLE IN PUDDING. PATIENT REQUIRES ASSISTANCE WITH
EATING OR TAKING IN FLUIDS. PATIENT HAS A PUREWICK IN PLACE WHICH IS MODERATEL
SUCCESFUL. PATIENT HAS BEEN TURNED Q2 HOURS PER ORDERS. PATIENT HAS SKIN BREAK
DOWN FROM A FALL BEFORE HER ADMIT ON HER RIGHT HIP WHICH IS DRESSED WITH
MEXIPLEX. PATIENT HAS SKIN BREAK DOWN ON HER RIGHT SOULDER. AREA IS LEFT OPEN
TO AIR PER WOUND CARE. PATIENTS IV FLIUDS WERE DISCONTINUED PER PROVIDER.
PATIENT WILL CONTINUE TO BE MONITORED.

## 2022-01-14 NOTE — NUR
SW reviewed chart and spoke with nursing and attending physician. Pt remains
in Enhanced Isolation due to COVID. Pt is afebrile and on room air. SIVAN
received call from Reyes at [facility] stating they do not have
any beds available at this time, and do not anticipate any openings until the
middle to end of next week. SIVAN left voice message for pt's , aNkia.
Requested call back to discuss alternate SNF options. Per chart, palliative
care NP spoke with pt's spouse yesterday about goals of care. Pt remains a
full code. Pt's spouse is not ready to consider hospice at this time. SIVAN is
following to assist as needed with discharge planning.

## 2022-01-14 NOTE — NUR
PT RESTING IN BED. PT ALERT TO SELF AND SITUATION. PT VERBALIZING NEEDS TO
NURSE. PURWICK INTACT. LUNGS CLEAR. BED ALARM ON.

## 2022-01-15 VITALS — DIASTOLIC BLOOD PRESSURE: 67 MMHG | SYSTOLIC BLOOD PRESSURE: 136 MMHG

## 2022-01-15 VITALS — DIASTOLIC BLOOD PRESSURE: 73 MMHG | SYSTOLIC BLOOD PRESSURE: 130 MMHG

## 2022-01-15 VITALS — SYSTOLIC BLOOD PRESSURE: 119 MMHG | DIASTOLIC BLOOD PRESSURE: 60 MMHG

## 2022-01-15 VITALS — SYSTOLIC BLOOD PRESSURE: 114 MMHG | DIASTOLIC BLOOD PRESSURE: 62 MMHG

## 2022-01-15 NOTE — NUR
PT RESTING IN BED, EASILY AROUSED. PURWICK INTACT. PT ASSISTED WITH A FEW
BITES OF ICE CREAM AND APPLE JUICE. PT YELLS OF DISCOMFORT WITH REPOSITIONING,
PRN PROVIDED. PROVIDER CONTACTED REGARDING POOR INTAKE AND U.O. NO NEW ORDERS
AT THIS TIME.

## 2022-01-16 VITALS — DIASTOLIC BLOOD PRESSURE: 63 MMHG | SYSTOLIC BLOOD PRESSURE: 122 MMHG

## 2022-01-16 VITALS — SYSTOLIC BLOOD PRESSURE: 139 MMHG | DIASTOLIC BLOOD PRESSURE: 73 MMHG

## 2022-01-16 VITALS — DIASTOLIC BLOOD PRESSURE: 76 MMHG | SYSTOLIC BLOOD PRESSURE: 135 MMHG

## 2022-01-16 VITALS — SYSTOLIC BLOOD PRESSURE: 110 MMHG | DIASTOLIC BLOOD PRESSURE: 62 MMHG

## 2022-01-16 LAB
ANION GAP SERPL CALC-SCNC: 9 MMOL/L (ref 7–16)
BASOPHILS NFR BLD AUTO: 0.6 % (ref 0–2)
BUN SERPL-MCNC: 28 MG/DL (ref 7–18)
CALCIUM SERPL-MCNC: 8.8 MG/DL (ref 8.5–10.1)
CHLORIDE SERPL-SCNC: 107 MMOL/L (ref 98–107)
CO2 SERPL-SCNC: 26 MMOL/L (ref 21–32)
CREAT SERPL-MCNC: 0.6 MG/DL (ref 0.6–1)
EOSINOPHIL NFR BLD: 1.2 % (ref 0–3)
ERYTHROCYTE [DISTWIDTH] IN BLOOD BY AUTOMATED COUNT: 13.4 % (ref 10.5–14.5)
GLUCOSE SERPL-MCNC: 94 MG/DL (ref 74–106)
GRANULOCYTES NFR BLD MANUAL: 74 % (ref 36–66)
HCT VFR BLD CALC: 35.8 % (ref 37–47)
HGB BLD-MCNC: 11.8 GM/DL (ref 12–15)
INR PPP: 1.05
LYMPHOCYTES NFR BLD AUTO: 9.7 % (ref 24–44)
MAGNESIUM SERPL-MCNC: 2.5 MG/DL (ref 1.8–2.4)
MCH RBC QN AUTO: 30.6 PG (ref 26–34)
MCHC RBC AUTO-ENTMCNC: 32.9 G/DL (ref 28–37)
MCV RBC: 93.1 FL (ref 80–100)
MONOCYTES NFR BLD: 14.5 % (ref 1–8)
NEUTROPHILS # BLD: 5.8 THOU/UL (ref 1.4–8.2)
PHOSPHATE SERPL-MCNC: 3.5 MG/DL (ref 2.5–4.9)
PLATELET # BLD: 310 THOU/UL (ref 150–400)
POTASSIUM SERPL-SCNC: 3.8 MMOL/L (ref 3.5–5.1)
PROTHROMBIN TIME: 11.4 SECONDS (ref 10.5–12.1)
RBC # BLD AUTO: 3.85 MIL/UL (ref 4.2–5)
SODIUM SERPL-SCNC: 142 MMOL/L (ref 136–145)
WBC # BLD AUTO: 7.8 THOU/UL (ref 4–11)

## 2022-01-16 NOTE — NUR
Patient has been alert and oriented x3 this shift. Luis is medsurge status.
Patient is on room air. Patient was given before this RN's shift. Patient has
no results. Patient has a purewick in place. Patient has been turned Q2 hours
as ordered. Patient calls out and appears to be anxious and fearful when
turned. Patient reports to this writer she is scared of falling out of the
bed. Patient is reassured that staff wont let her fall. Luis has foam gauze
on her right hip and her right shoulder. Patient has an IV in her left forearm
that is patent and saline locked. Patient is on enhanced precautions. Patient
will continue to be monitored.

## 2022-01-17 VITALS — DIASTOLIC BLOOD PRESSURE: 72 MMHG | SYSTOLIC BLOOD PRESSURE: 134 MMHG

## 2022-01-17 VITALS — DIASTOLIC BLOOD PRESSURE: 62 MMHG | SYSTOLIC BLOOD PRESSURE: 118 MMHG

## 2022-01-17 VITALS — DIASTOLIC BLOOD PRESSURE: 62 MMHG | SYSTOLIC BLOOD PRESSURE: 140 MMHG

## 2022-01-17 VITALS — DIASTOLIC BLOOD PRESSURE: 64 MMHG | SYSTOLIC BLOOD PRESSURE: 119 MMHG

## 2022-01-17 NOTE — NUR
ASSUMED CARE OF PATIENT AT APPX. 1600.  PATIENT SETTLED IN ROOM AND NO MEDS
DUE.  ASSESSMENT COMPLETED AND DOCUMENTED.  REPORT
TO BE GIVEN TO THE ON COMING SHIFT.

## 2022-01-17 NOTE — NUR
WOUND CARE F/U;
THE RIGHT SCAPULA FRICTION WOUND HAS IMPROVED AND HAS NO S/S OF INFECTION.
THE RIGHT HIP IS WORSE TODAY WITH NECROSIS PRESENT.
 
RECOMMENDATIONS; CONSULT DR GARCIA TODAY.

## 2022-01-17 NOTE — NUR
PT VERY APPREHENSIVE WITH TURNING FROM SIDE TO SIDE IN BED.  PT OFTEN BEGINS
TO CRY AND PLEAD NOT TO BE MOVED EVEN WITH THE SMALLES OF MOVES.  SUPPORT
GIVEN.  TURNS DONE SLOWLY.

## 2022-01-17 NOTE — NUR
SIVAN reviewed chart and spoke with nursing and attending physician. Pt remains
in Enhanced Isolation due to COVID. Pt is afebrile and on room air. GI
consulted for possible peg tube placement. Psych consulted due to hx of
depression. Pt started on a calorie count. SIVAN contacted Sis, admissions
liaison at [facility] to follow up on SNF referral. Sis to discuss with her
clinical team. SIVAN is following to assist as needed with discharge planning.

## 2022-01-17 NOTE — NUR
1. Please obtain current weight
2. If PEG placed, recommend: Jevity 1.5 start @ 20 ml/hr, advance as toelrated
to goal 40 ml/hr with 140 ml H2O flushes q6hrs to provide 960 ml fromula, 1440
kcal, 61 gm pro, and 1570 ml total water.

## 2022-01-17 NOTE — NUR
Patient is alert and oriented x3. Patient at the beginning of this shift was
on enhanced precautions. Patients enhanced precautions are discontinued this
shift. Patient is on room air. Patient is med- surge status. Patients last
documented BM is 1/11/22. Patient was giving miralax the moring of the 16th
with no results. Patient has a purewick in place. Luis takes pills whole in
vanilla pudding. Paient requires assistance with feeding. Patient has skin
break down on her right hip and right shoulder. Both areas have a foam
dressing in place. Patient has an IV in her left forearm that is patent and
saline locked. Patient will continue to be monitored.

## 2022-01-18 VITALS — DIASTOLIC BLOOD PRESSURE: 78 MMHG | SYSTOLIC BLOOD PRESSURE: 120 MMHG

## 2022-01-18 VITALS — SYSTOLIC BLOOD PRESSURE: 134 MMHG | DIASTOLIC BLOOD PRESSURE: 76 MMHG

## 2022-01-18 VITALS — DIASTOLIC BLOOD PRESSURE: 66 MMHG | SYSTOLIC BLOOD PRESSURE: 121 MMHG

## 2022-01-18 VITALS — DIASTOLIC BLOOD PRESSURE: 89 MMHG | SYSTOLIC BLOOD PRESSURE: 138 MMHG

## 2022-01-18 VITALS — DIASTOLIC BLOOD PRESSURE: 78 MMHG | SYSTOLIC BLOOD PRESSURE: 130 MMHG

## 2022-01-18 NOTE — NUR
SIVAN reviewed chart and spoke with attending physician. Enhanced Isolation
precautions were discontinued yesterday. Pt transferred to  from 3W. Pt is
progressing towards goals for discharge. Pt's spouse is not wanting a peg tube
placed at this time. SIVAN contacted Sis, admissions liaison at [facility] to
follow up on referral. Awaiting call back from Sis. Pt's spouse requested SW
check with [facility] again. SIVAN faxed referral info to
[facility] for review. Notified Dali in admissions of
referral. SIVAN is following to assist as needed with discharge planning.

## 2022-01-18 NOTE — NUR
Pt eating minimal amounts, 315 kai and 6 g protein, despite voicing she is
hungry.  Taking less than 25% of ensure.  On Megace since 1/14.  Note spouse
not wanting PEG tube, however pt will need if going to continue aggressive
care.  Severe protein calorie malnutrition.

## 2022-01-18 NOTE — NUR
PT ALERT AND ORIENTED TIMES FOUR WITH PERIODS OF CONFUSION, AND SOMEWHAT SLOW
TO RESPOND TO QUESTIONS. VSS. PT TOLERATES MEDS, BUT EATS VERY SMALL PORTIONS
OF MEALS. PT C/O PAIN WHEN BEING MOVED. PT WORKED FAIR WITH PHYSICAL THEARPY
TODAY. PT DAUGHTER AT BEDSIDE THIS AFTERNOON. PT SLOWLY PROGRESSING [facility]
POC GOALS.

## 2022-01-18 NOTE — NUR
RECEIEVED THE PATIENT AT 1900H.ON ROOM AIR BREATHING SPONTANEOUSLY.ASSESSMENT
DONE AS CHARTED.MEDS GIVEN AS PER MAR.ALL NEEDS ATTENDED.TO CONTINOUSLY
MONITOR.

## 2022-01-19 VITALS — SYSTOLIC BLOOD PRESSURE: 122 MMHG | DIASTOLIC BLOOD PRESSURE: 61 MMHG

## 2022-01-19 VITALS — DIASTOLIC BLOOD PRESSURE: 66 MMHG | SYSTOLIC BLOOD PRESSURE: 117 MMHG

## 2022-01-19 VITALS — SYSTOLIC BLOOD PRESSURE: 117 MMHG | DIASTOLIC BLOOD PRESSURE: 70 MMHG

## 2022-01-19 VITALS — SYSTOLIC BLOOD PRESSURE: 140 MMHG | DIASTOLIC BLOOD PRESSURE: 57 MMHG

## 2022-01-19 VITALS — DIASTOLIC BLOOD PRESSURE: 76 MMHG | SYSTOLIC BLOOD PRESSURE: 135 MMHG

## 2022-01-19 NOTE — NUR
SIVAN reviewed chart and spoke with attending physician. Pt is progressing
towards goals for discharge. SIVAN spoke with Ange in admissions at [facility],
who states they are reviewing pt's info and checking bed availability. SIVAN
contacted [facility] lia[facility] to see if they are accepting admissions
at either [facility] or [facility]. Pt's family prefer pt stay close
to [facility]. [facility] is unable to accept pt at this time. SIVAN is
following to assist as needed with discharge planning.

## 2022-01-19 NOTE — NUR
PT IS ALERT AND ORIENTED X4. SPOUSE AT BEDSIDE THIS EVENING. WOUNDS  CLEANED
AND DRESSING PUT ON THEM. MULTIPLE WOUNDS ON HEELS , SHOULDER, BARRIER CREAM
TO COCCYX AREA. AND REPOSITION. PT THINKS SHE IS GONNA FALL AND DOESNT LIKE
THE TURNS. REASURED PT NOT GOING TO FALL. SCDS ON AND BILATERAL BOOTS ON PT.
PURE WICK IN PLACE. LEXIS URINE NOTED. PT IS VERY THIN AND MALNURISHED APPEARS
TO BE. ONGOING NURSING CARE AT THIS TIME. CALL LIGHT WITHIN REACH IF NEEDS
ASSISTANCE.

## 2022-01-20 VITALS — DIASTOLIC BLOOD PRESSURE: 51 MMHG | SYSTOLIC BLOOD PRESSURE: 148 MMHG

## 2022-01-20 VITALS — SYSTOLIC BLOOD PRESSURE: 123 MMHG | DIASTOLIC BLOOD PRESSURE: 63 MMHG

## 2022-01-20 VITALS — DIASTOLIC BLOOD PRESSURE: 58 MMHG | SYSTOLIC BLOOD PRESSURE: 104 MMHG

## 2022-01-20 VITALS — DIASTOLIC BLOOD PRESSURE: 64 MMHG | SYSTOLIC BLOOD PRESSURE: 130 MMHG

## 2022-01-20 NOTE — NUR
SIVAN reviewed chart and spoke with nursing and attending physician. Pt's dtr,
Camryn, was at the bedside earlier today and requested to speak with SW. Pt's
dtr left prior to SW visit. SW placed call to Camryn ([phone]). Camryn
requested SW call her back in a few minutes. SIVAN updated palliative care NP,
who has been trying to reach pt's spouse. SIVAN is following to assist as needed
with discharge planning.

## 2022-01-21 VITALS — DIASTOLIC BLOOD PRESSURE: 60 MMHG | SYSTOLIC BLOOD PRESSURE: 114 MMHG

## 2022-01-21 VITALS — SYSTOLIC BLOOD PRESSURE: 147 MMHG | DIASTOLIC BLOOD PRESSURE: 79 MMHG

## 2022-01-21 VITALS — DIASTOLIC BLOOD PRESSURE: 67 MMHG | SYSTOLIC BLOOD PRESSURE: 120 MMHG

## 2022-01-21 VITALS — DIASTOLIC BLOOD PRESSURE: 68 MMHG | SYSTOLIC BLOOD PRESSURE: 136 MMHG

## 2022-01-21 VITALS — DIASTOLIC BLOOD PRESSURE: 59 MMHG | SYSTOLIC BLOOD PRESSURE: 129 MMHG

## 2022-01-21 NOTE — NUR
PT RESTING COMFORTABLY THRU THE NOC EXCEPT WHEN REPOSIIONED SHE SCREAMS WITH
PAIN, YET DENIED NEED FOR MEDS, VSS, REPORT GIVEN TO NEXT SHIFT TO CON'T PPOC.

## 2022-01-21 NOTE — NUR
SIVAN reviewed chart and spoke with nursing and attending physician. Reviewed
case with palliative care NP, who had spoken with pt's dtr, Camryn. Pt's dtr
would like for pt to be moved to a SNF, and then monitor her progress for a
couple of weeks, before deciding on plan of care (possible hospice).
Palliative care NP has not yet spoken with pt's spouse. SIVAN spoke with pt's
spouse via phone. Pt's spouse is agreeable with additional SNF referrals, but
would also like to speak with palliative care. SIVAN spoke with pt's dtr, Camryn,
via phone. Emailed Camryn list of SNFs for review. SIVAN updated palliative care
NP. Pt's spouse will not be in this evening as planned, due to his health
issues. SIVAN is following to assist as needed with discharge planning.

## 2022-01-22 VITALS — SYSTOLIC BLOOD PRESSURE: 134 MMHG | DIASTOLIC BLOOD PRESSURE: 62 MMHG

## 2022-01-22 VITALS — SYSTOLIC BLOOD PRESSURE: 135 MMHG | DIASTOLIC BLOOD PRESSURE: 64 MMHG

## 2022-01-22 VITALS — DIASTOLIC BLOOD PRESSURE: 57 MMHG | SYSTOLIC BLOOD PRESSURE: 120 MMHG

## 2022-01-22 NOTE — NUR
RECEIVED CARE OF THIS PATIENT AT 1900.  PATIENT ALERT AND ORIENTED X4 BUT SLOW
TO RESPOND.  DOES NOT INITIATE CONVERSATION.  C/O PAIN, MED GIVEN.  SLEPT MOST
OF NIGHT.

## 2022-01-23 VITALS — SYSTOLIC BLOOD PRESSURE: 148 MMHG | DIASTOLIC BLOOD PRESSURE: 79 MMHG

## 2022-01-23 VITALS — SYSTOLIC BLOOD PRESSURE: 144 MMHG | DIASTOLIC BLOOD PRESSURE: 68 MMHG

## 2022-01-23 VITALS — SYSTOLIC BLOOD PRESSURE: 122 MMHG | DIASTOLIC BLOOD PRESSURE: 68 MMHG

## 2022-01-23 VITALS — DIASTOLIC BLOOD PRESSURE: 77 MMHG | SYSTOLIC BLOOD PRESSURE: 161 MMHG

## 2022-01-24 VITALS — SYSTOLIC BLOOD PRESSURE: 119 MMHG | DIASTOLIC BLOOD PRESSURE: 69 MMHG

## 2022-01-24 VITALS — DIASTOLIC BLOOD PRESSURE: 85 MMHG | SYSTOLIC BLOOD PRESSURE: 150 MMHG

## 2022-01-24 VITALS — DIASTOLIC BLOOD PRESSURE: 67 MMHG | SYSTOLIC BLOOD PRESSURE: 136 MMHG

## 2022-01-24 VITALS — SYSTOLIC BLOOD PRESSURE: 150 MMHG | DIASTOLIC BLOOD PRESSURE: 67 MMHG

## 2022-01-24 NOTE — NUR
assumed pt care at 1900, arientedx2, pain all over,scheduled pain meds given,
vital signs stable, pts yells stop with any tactile stimulation. no distress
noted, will pass on report

## 2022-01-24 NOTE — NUR
SW reviewed chart and spoke with nursing and attending physician. Palliative
care NP did meet with pt's spouse on Friday evening. Pt's code status changed
to DNR. SW received message from pt's dtr, Camryn, requesting referrals to be
sent to several facilities. Pt's dtr has reviewed list with family.
[facility]-SW faxed referral and left voice message
for the admissions dept.
[facility]-spoke with Cat in admissions. Facility
is not currently accepting new residents into their facility at thist time.
[facility]--has declined pt twice.
[facility]-SW faxed referral to  [facility]. Left voice
message for Lisset in admissions.
[facility]--SNF is closed
[facility]-faxed referral and spoke with Tram in admissions.
[facility]-facility is full at this time. Discussed with [facility]
liaison.
[facility]-faxed referral and left voice message for Kristine in
admissions.
[facility]-spoke with Rufina. Facility is not accepting new
residents into their facility.
SIVAN updated pt's dtr. SIVAN contacted palliative care NP to discuss possibly
having a family meeting with both pt's spouse and dtr present to discuss plan
of care. Pt's dtr is interested in SNF placement and pt's spouse is interested
in hospice. SW is following to assist as needed with discharge planning.

## 2022-01-25 VITALS — DIASTOLIC BLOOD PRESSURE: 60 MMHG | SYSTOLIC BLOOD PRESSURE: 113 MMHG

## 2022-01-25 VITALS — DIASTOLIC BLOOD PRESSURE: 67 MMHG | SYSTOLIC BLOOD PRESSURE: 125 MMHG

## 2022-01-25 NOTE — NUR
SIVAN reviewed chart and spoke with attending physician. SW faxed clinical
updates to [facility] per their request. Awaiting input from pt's
spouse regarding [facility]. SIVAN contacted [facility],
who both will review pt's info. SIVAN left voice message for Leobardo and
Mert at [facility]. Pt's dtr contacted SIVAN requesting to speak with
palliative care NP regarding plan of care. SIVAN notified palliative care NP, who
will reach out to pt's dtr to discuss plan of care. SIVAN is following to assist
as needed with discharge planning.

## 2022-01-25 NOTE — NUR
PT RESTING IN BED,  VISITED. ALERT TO SELF AND SITUATION. CONTRACTED R
ARM, LEGS CONTRACT IN. PURWICK INTACT. MEDS IN ICE CREAM. LUNGS WITH CRACKLES.
WOUND DRESSINGS INTACT. BED ALARM ON.  VISITED.

## 2022-01-26 VITALS — DIASTOLIC BLOOD PRESSURE: 73 MMHG | SYSTOLIC BLOOD PRESSURE: 125 MMHG

## 2022-01-26 NOTE — NUR
pt received pain med x1 and refused rest of her meds last noc and this am,
only repositioned a few times after pain med but then refused those also,
reort given to next shift to con't [facility].

## 2022-01-26 NOTE — NUR
SIVAN reviewed chart and spoke with attending physician. Palliative care NP spoke
with pt's dtr yesterday about transitioning to comfort care/eval for hospice
house admissions. SIVAN spoke with both pt's dtr and spouse earlier today to
discuss options. Both are agreeable with referral to  [facility]. SIVAN explained referral/evaluation process. SIVAN faxed
referrals to the facilities. SIVAN spoke with Alexei in intake. Confirmed info was
received. Carito, in intake to follow up with SIVAN. SIVAN spoke with Florence at [facility], who states they cannot do an onsite evaluation, but can to a peer to
peer review with pt's nurse.  SIVAN updated pt's nurse, Lula. SIVAN is following
to assist as needed with discharge planning.

## 2023-07-19 NOTE — NUR
Recommend order for MVI, zinc sulfate, and vitamin C supplementation due to
multiple wounds.
Assist pt to order own food selections, call down to dietary St[facility]